# Patient Record
Sex: FEMALE | Race: WHITE
[De-identification: names, ages, dates, MRNs, and addresses within clinical notes are randomized per-mention and may not be internally consistent; named-entity substitution may affect disease eponyms.]

---

## 2017-07-28 ENCOUNTER — HOSPITAL ENCOUNTER (OUTPATIENT)
Dept: HOSPITAL 17 - NEPC | Age: 62
Setting detail: OBSERVATION
LOS: 1 days | Discharge: HOME | End: 2017-07-29
Attending: INTERNAL MEDICINE | Admitting: INTERNAL MEDICINE
Payer: MEDICARE

## 2017-07-28 VITALS — DIASTOLIC BLOOD PRESSURE: 72 MMHG | SYSTOLIC BLOOD PRESSURE: 119 MMHG | HEART RATE: 67 BPM | OXYGEN SATURATION: 97 %

## 2017-07-28 VITALS — HEART RATE: 72 BPM

## 2017-07-28 VITALS
HEART RATE: 56 BPM | SYSTOLIC BLOOD PRESSURE: 135 MMHG | OXYGEN SATURATION: 98 % | RESPIRATION RATE: 18 BRPM | TEMPERATURE: 98 F | DIASTOLIC BLOOD PRESSURE: 63 MMHG

## 2017-07-28 VITALS
TEMPERATURE: 98.4 F | RESPIRATION RATE: 20 BRPM | HEART RATE: 76 BPM | DIASTOLIC BLOOD PRESSURE: 73 MMHG | OXYGEN SATURATION: 99 % | SYSTOLIC BLOOD PRESSURE: 129 MMHG

## 2017-07-28 VITALS — WEIGHT: 284.4 LBS | HEIGHT: 72 IN | BODY MASS INDEX: 38.52 KG/M2

## 2017-07-28 VITALS — HEART RATE: 65 BPM

## 2017-07-28 VITALS
DIASTOLIC BLOOD PRESSURE: 57 MMHG | OXYGEN SATURATION: 98 % | RESPIRATION RATE: 17 BRPM | HEART RATE: 61 BPM | SYSTOLIC BLOOD PRESSURE: 113 MMHG

## 2017-07-28 VITALS
DIASTOLIC BLOOD PRESSURE: 71 MMHG | RESPIRATION RATE: 19 BRPM | HEART RATE: 62 BPM | OXYGEN SATURATION: 98 % | SYSTOLIC BLOOD PRESSURE: 120 MMHG | TEMPERATURE: 97.9 F

## 2017-07-28 VITALS
OXYGEN SATURATION: 94 % | HEART RATE: 65 BPM | SYSTOLIC BLOOD PRESSURE: 109 MMHG | RESPIRATION RATE: 20 BRPM | DIASTOLIC BLOOD PRESSURE: 57 MMHG | TEMPERATURE: 97.3 F

## 2017-07-28 VITALS — HEART RATE: 62 BPM

## 2017-07-28 DIAGNOSIS — R07.89: Primary | ICD-10-CM

## 2017-07-28 DIAGNOSIS — Z79.899: ICD-10-CM

## 2017-07-28 DIAGNOSIS — M79.602: ICD-10-CM

## 2017-07-28 DIAGNOSIS — R94.31: ICD-10-CM

## 2017-07-28 DIAGNOSIS — Z86.718: ICD-10-CM

## 2017-07-28 DIAGNOSIS — E66.01: ICD-10-CM

## 2017-07-28 DIAGNOSIS — R06.00: ICD-10-CM

## 2017-07-28 DIAGNOSIS — H40.9: ICD-10-CM

## 2017-07-28 DIAGNOSIS — J45.909: ICD-10-CM

## 2017-07-28 DIAGNOSIS — I10: ICD-10-CM

## 2017-07-28 LAB
ALP SERPL-CCNC: 58 U/L (ref 45–117)
ALT SERPL-CCNC: 13 U/L (ref 10–53)
ANION GAP SERPL CALC-SCNC: 6 MEQ/L (ref 5–15)
APTT BLD: 25.7 SEC (ref 24.3–30.1)
AST SERPL-CCNC: 8 U/L (ref 15–37)
BASOPHILS # BLD AUTO: 0 TH/MM3 (ref 0–0.2)
BASOPHILS NFR BLD: 0.5 % (ref 0–2)
BILIRUB SERPL-MCNC: 1 MG/DL (ref 0.2–1)
BUN SERPL-MCNC: 14 MG/DL (ref 7–18)
CHLORIDE SERPL-SCNC: 104 MEQ/L (ref 98–107)
CK SERPL-CCNC: 29 U/L (ref 26–192)
CK SERPL-CCNC: 32 U/L (ref 26–192)
CK SERPL-CCNC: 43 U/L (ref 26–192)
EOSINOPHIL # BLD: 0.1 TH/MM3 (ref 0–0.4)
EOSINOPHIL NFR BLD: 1.3 % (ref 0–4)
ERYTHROCYTE [DISTWIDTH] IN BLOOD BY AUTOMATED COUNT: 14 % (ref 11.6–17.2)
GFR SERPLBLD BASED ON 1.73 SQ M-ARVRAT: 81 ML/MIN (ref 89–?)
HCO3 BLD-SCNC: 29.1 MEQ/L (ref 21–32)
HCT VFR BLD CALC: 44.3 % (ref 35–46)
HEMO FLAGS: (no result)
INR PPP: 0.9 RATIO
LYMPHOCYTES # BLD AUTO: 1.8 TH/MM3 (ref 1–4.8)
LYMPHOCYTES NFR BLD AUTO: 26.6 % (ref 9–44)
MAGNESIUM SERPL-MCNC: 2.4 MG/DL (ref 1.5–2.5)
MCH RBC QN AUTO: 28.2 PG (ref 27–34)
MCHC RBC AUTO-ENTMCNC: 32.6 % (ref 32–36)
MCV RBC AUTO: 86.5 FL (ref 80–100)
MONOCYTES NFR BLD: 10 % (ref 0–8)
NEUTROPHILS # BLD AUTO: 4.1 TH/MM3 (ref 1.8–7.7)
NEUTROPHILS NFR BLD AUTO: 61.6 % (ref 16–70)
PLATELET # BLD: 145 TH/MM3 (ref 150–450)
POTASSIUM SERPL-SCNC: 4.7 MEQ/L (ref 3.5–5.1)
PROTHROMBIN TIME: 10.1 SEC (ref 9.8–11.6)
RBC # BLD AUTO: 5.12 MIL/MM3 (ref 4–5.3)
SODIUM SERPL-SCNC: 139 MEQ/L (ref 136–145)
WBC # BLD AUTO: 6.6 TH/MM3 (ref 4–11)

## 2017-07-28 PROCEDURE — 85730 THROMBOPLASTIN TIME PARTIAL: CPT

## 2017-07-28 PROCEDURE — 96374 THER/PROPH/DIAG INJ IV PUSH: CPT

## 2017-07-28 PROCEDURE — 93017 CV STRESS TEST TRACING ONLY: CPT

## 2017-07-28 PROCEDURE — 85610 PROTHROMBIN TIME: CPT

## 2017-07-28 PROCEDURE — 71275 CT ANGIOGRAPHY CHEST: CPT

## 2017-07-28 PROCEDURE — 83735 ASSAY OF MAGNESIUM: CPT

## 2017-07-28 PROCEDURE — 80053 COMPREHEN METABOLIC PANEL: CPT

## 2017-07-28 PROCEDURE — G0378 HOSPITAL OBSERVATION PER HR: HCPCS

## 2017-07-28 PROCEDURE — 93005 ELECTROCARDIOGRAM TRACING: CPT

## 2017-07-28 PROCEDURE — 85025 COMPLETE CBC W/AUTO DIFF WBC: CPT

## 2017-07-28 PROCEDURE — 71010: CPT

## 2017-07-28 PROCEDURE — 99285 EMERGENCY DEPT VISIT HI MDM: CPT

## 2017-07-28 PROCEDURE — 85379 FIBRIN DEGRADATION QUANT: CPT

## 2017-07-28 PROCEDURE — 82550 ASSAY OF CK (CPK): CPT

## 2017-07-28 PROCEDURE — 78452 HT MUSCLE IMAGE SPECT MULT: CPT

## 2017-07-28 PROCEDURE — 96361 HYDRATE IV INFUSION ADD-ON: CPT

## 2017-07-28 PROCEDURE — A9502 TC99M TETROFOSMIN: HCPCS

## 2017-07-28 PROCEDURE — 84484 ASSAY OF TROPONIN QUANT: CPT

## 2017-07-28 RX ADMIN — ACYCLOVIR SCH TAB: 800 TABLET ORAL at 17:59

## 2017-07-28 RX ADMIN — PHENYTOIN SODIUM SCH MLS/HR: 50 INJECTION INTRAMUSCULAR; INTRAVENOUS at 17:59

## 2017-07-28 RX ADMIN — Medication SCH ML: at 20:39

## 2017-07-28 RX ADMIN — PHENYTOIN SODIUM SCH MLS/HR: 50 INJECTION INTRAMUSCULAR; INTRAVENOUS at 10:07

## 2017-07-28 NOTE — RADRPT
EXAM DATE/TIME:  07/28/2017 13:04 

 

[facility] COMPARISON:     

No previous studies available for comparison.

 

 

INDICATIONS :     

Right upper quadrant pain and shortness of breath.

                      

 

IV CONTRAST:     

70 cc Omnipaque 350 (iohexol) IV 

 

 

RADIATION DOSE:     

25.81 CTDIvol (mGy) 

 

 

MEDICAL HISTORY :     

Hypertension. Deep venous thrombosis. 

 

SURGICAL HISTORY :      

Hysterectomy. Gastric bypass. Multiple hernia repairs

 

ENCOUNTER:      

Initial

 

ACUITY:      

2 days

 

PAIN SCALE:      

5/10

 

LOCATION:       

Right upper quadrant 

 

TECHNIQUE:     

Volumetric scanning of the chest was performed using a pulmonary embolism protocol MIP images were re
constructed.  Using automated exposure control and adjustment of the mA and/or kV according to patien
t size, radiation dose was kept as low as reasonably achievable to obtain optimal diagnostic quality 
images.   DICOM format image data is available electronically for review and comparison.  

 

Follow-up recommendations for incidentally detected pulmonary nodules are based at a minimum on nodul
e size and patient risk factors according to Fleischner Society Guidelines.

 

FINDINGS:     

The lungs are clear without infiltrate, nodule, or mass.  There is no pleural effusion.  No appreciab
le pathological adenopathy is seen within the mediastinum. There is no evidence for PE for technique.
 Coronary artery calcifications are seen typically seen with CAD and need to be evaluated clinically.


 

CONCLUSION:     

Unremarkable study except for coronary calcifications.

 

 

 

 

 BERTHA Barker MD on July 28, 2017 at 13:35           

Board Certified Radiologist.

 This report was verified electronically.

## 2017-07-28 NOTE — PD
HPI


Chief Complaint:  Respiratory Symptoms


Time Seen by Provider:  09:56


Travel History


International Travel<30 days:  No


Contact w/Intl Traveler<30days:  No


Traveled to known affect area:  No





History of Present Illness


HPI


61-year-old female with a history of hypertension, obesity, who presents with 

complaints of sudden onset of shortness of breath.  The patient denied chest 

pain, chest pressure.  She stated yesterday she started sprinting tingling down 

her left arm.  She reports no previous history of such.  She does report that 

she has a history of previous PVCs and had 2 ablation techniques when in 

[place].  She has been living here for a year and has not followed up with a 

cardiologist as she's had difficulty finding one.  She denies any chest pain, 

chest pressure.  She reports feeling "not right".  She does also have swelling 

in her legs.  She states that more than her left leg and in her right.  She 

reports that her ankle.  She denies a calf tenderness.  She does have an IVC 

filter in place.  She had that when she had her gastric bypass surgery many 

years ago.  He takes no anticoagulation.  The patient is allergic to anti-

inflammatories and therefore has not taken an aspirin.





PFSH


Past Medical History


Asthma:  Yes


Heart Rhythm Problems:  Yes (pvc)


Cardiovascular Problems:  Yes


Deep Vein Thrombosis:  Yes (IVC filter)


Hypertension:  Yes


Medical other:  Yes (gastric bypass )


Respiratory:  Yes


Influenza Vaccination:  Yes





Past Surgical History


Abdominal Surgery:  Yes (hernia repair)


Cardiac Surgery:  Yes (ablation)


Cholecystectomy:  Yes


Eye Surgery:  Yes (right eye removed d/t trauma)


Hysterectomy:  Yes


Tonsillectomy:  Yes (adenoids)


Other Surgery:  Yes (vena cath filter)





Social History


Alcohol Use:  No


Tobacco Use:  No


Substance Use:  No





Allergies-Medications


(Allergen,Severity, Reaction):  


Coded Allergies:  


     Adhesives (Verified  Allergy, Severe, Swelling, 7/28/17)


     Levaquin (Verified  Allergy, Severe, Edema, 7/28/17)


     Nonsteroidal Anti-Inflammatory Agts (Verified  Allergy, Severe, Rash, 7/28/ 17)


Reported Meds & Prescriptions





Reported Meds & Active Scripts


Active


Reported


Ergocalciferol 50,000 Unit Cap 50,000 Units PO Q7D


Multiple Vitamin 1 Tab 1 Tab PO DAILY


Ventolin Hfa 18 GM Inh (Albuterol Sulfate) 90 Mcg/Act Aer 2 Puff INH Q4H PRN


Lisinopril 5 Mg Tab 5 Mg PO DAILY








Review of Systems


Except as stated in HPI:  all other systems reviewed are Neg


General / Constitutional:  No: Fever, Chills


HENT:  No: Headaches, Lightheadedness, Neck Pain


Cardiovascular:  No: Chest Pain or Discomfort, Palpitations, Irregular Rhythm


Respiratory:  Positive: Shortness of Breath,  No: Cough


Gastrointestinal:  Positive: Nausea, Vomiting, Abdominal Pain (chronic 

abdominal pain secondary to her gastric bypass.  Nothing new.),  No: Diarrhea


Genitourinary:  No: Frequency, Dysuria


Musculoskeletal:  Positive: Edema (lateral lower extremity is, worse on the left

),  No: Weakness, Pain


Neurologic:  No: Weakness, Dizziness, Syncope, Headache





Physical Exam


Narrative


GENERAL: This is a very pleasant obese female in no obvious respiratory 

distress.


SKIN: Focused skin assessment warm/dry.


HEAD: Atraumatic. Normocephalic. 


EYES: A nucleated right eye secondary to previous trauma.  No scleral icterus. 

No injection or drainage. 


ENT:  Mucous membranes pink and moist.


NECK: Trachea midline.  Supple.


CARDIOVASCULAR: Regular rate and rhythm.  No murmur appreciated.


RESPIRATORY: No accessory muscle use. Clear to auscultation. Breath sounds 

equal bilaterally. 


GASTROINTESTINAL: Abdomen soft, non-tender, nondistended. Hepatic and splenic 

margins not palpable. 


MUSCULOSKELETAL: No obvious deformities. No clubbing.  No cyanosis.  Bilateral 

lower extremity pretibial edema.  Questionable slight increase on the left 

ankle versus right.  After tenderness, Homans sign.


NEUROLOGICAL: Awake and alert. No obvious cranial nerve deficits.  Motor 

grossly within normal limits. Normal speech.


PSYCHIATRIC: Appropriate mood and affect; insight and judgment normal.





Data


Data


Last Documented VS





Vital Signs








  Date Time  Temp Pulse Resp B/P Pulse Ox O2 Delivery O2 Flow Rate FiO2


 


7/28/17 09:56  67  119/72 97 Room Air  


 


7/28/17 09:26 98.4  20     








Orders





 Electrocardiogram (7/28/17 09:56)


Ckmb (Isoenzyme) Profile (7/28/17 09:56)


Complete Blood Count With Diff (7/28/17 09:56)


Comprehensive Metabolic Panel (7/28/17 09:56)


D-Dimer (7/28/17 09:56)


Magnesium (Mg) (7/28/17 09:56)


Prothrombin Time / Inr (Pt) (7/28/17 09:56)


Act Partial Throm Time (Ptt) (7/28/17 09:56)


Troponin I (7/28/17 09:56)


Chest, Single Ap (7/28/17 09:56)


Ecg Monitoring (7/28/17 09:56)


Bilateral Bp Monitoring (7/28/17 09:56)


Iv Access Insert/Monitor (7/28/17 09:56)


Oximetry (7/28/17 09:56)


Oxygen Administration (7/28/17 09:56)


Sodium Chloride 0.9% Flush (Ns Flush) (7/28/17 10:00)


Ondansetron Inj (Zofran Inj) (7/28/17 10:00)


Sodium Chlor 0.9% 1000 Ml Inj (Ns 1000 M (7/28/17 10:00)


Ct Pulmonary Angiogram (7/28/17 12:15)


Iohexol 350 Inj (Omnipaque 350 Inj) (7/28/17 13:23)


Admit Order (Ed Use Only) (7/28/17 13:59)





Labs








 Laboratory Tests








Test 7/28/17





 10:12


 


White Blood Count 6.6 TH/MM3


 


Red Blood Count 5.12 MIL/MM3


 


Hemoglobin 14.4 GM/DL


 


Hematocrit 44.3 %


 


Mean Corpuscular Volume 86.5 FL


 


Mean Corpuscular Hemoglobin 28.2 PG


 


Mean Corpuscular Hemoglobin 32.6 %





Concent 


 


Red Cell Distribution Width 14.0 %


 


Platelet Count 145 TH/MM3


 


Mean Platelet Volume 9.4 FL


 


Neutrophils (%) (Auto) 61.6 %


 


Lymphocytes (%) (Auto) 26.6 %


 


Monocytes (%) (Auto) 10.0 %


 


Eosinophils (%) (Auto) 1.3 %


 


Basophils (%) (Auto) 0.5 %


 


Neutrophils # (Auto) 4.1 TH/MM3


 


Lymphocytes # (Auto) 1.8 TH/MM3


 


Monocytes # (Auto) 0.7 TH/MM3


 


Eosinophils # (Auto) 0.1 TH/MM3


 


Basophils # (Auto) 0.0 TH/MM3


 


CBC Comment DIFF FINAL 


 


Differential Comment  


 


Prothrombin Time 10.1 SEC


 


Prothromb Time International 0.9 RATIO





Ratio 


 


Activated Partial 25.7 SEC





Thromboplast Time 


 


D-Dimer Quantitative (PE/DVT) 0.80 MG/L FEU


 


Sodium Level 139 MEQ/L


 


Potassium Level 4.7 MEQ/L


 


Chloride Level 104 MEQ/L


 


Carbon Dioxide Level 29.1 MEQ/L


 


Anion Gap 6 MEQ/L


 


Blood Urea Nitrogen 14 MG/DL


 


Creatinine 0.73 MG/DL


 


Estimat Glomerular Filtration 81 ML/MIN





Rate 


 


Random Glucose 99 MG/DL


 


Calcium Level 9.3 MG/DL


 


Magnesium Level 2.4 MG/DL


 


Total Bilirubin 1.0 MG/DL


 


Aspartate Amino Transf 8 U/L





(AST/SGOT) 


 


Alanine Aminotransferase 13 U/L





(ALT/SGPT) 


 


Alkaline Phosphatase 58 U/L


 


Total Creatine Kinase 43 U/L


 


Troponin I LESS THAN 0.02





 NG/ML


 


Total Protein 7.9 GM/DL


 


Albumin 4.0 GM/DL














[facility]


Medical Decision Making


Medical Screen Exam Complete:  Yes


Emergency Medical Condition:  Yes


Differential Diagnosis


ACS versus pulmonary embolus versus pneumonia versus CHF


Narrative Course


61-year-old female with history of hypertension, previous gastric bypass surgery

, who presents today with complaints of dyspnea and chest pain.  The patient 

had chest pain earlier followed by shortness of breath.  EKG shows no evidence 

of acute findings.  Cardiac enzymes were within normal limits.  Chest x-ray 

showed no acute findings.  Her d-dimer was elevated at 0.8 so CT angiogram was 

ordered.  This is negative for acute pulmonary him most.  The patient has had 

previous DVTs in the past but does have an IVC filter.  Given her history and 

comorbid conditions being hypertension and obesity, I would feel comfortable 

having her ruled out in the chest pain center.  I discussed this with both her 

and her partner and they're amenable.





Diagnosis





 Primary Impression:  


 Chest pain


 Additional Impressions:  


 Dyspnea


 History of hypertension


 Obesity





Admitting Information


Admitting Physician Requests:  Observation








Cody Jeronimo MD Jul 28, 2017 10:06

## 2017-07-28 NOTE — EKG
Date Performed: 07/28/2017       Time Performed: 09:50:31

 

PTAGE:      61 years

 

EKG:      Sinus rhythm 

 

 MARKED LEFT AXIS DEVIATION MODERATE INTRAVENTRICULAR CONDUCTION DELAY MINIMAL VOLTAGE CRITERIA FOR L
VH, CONSIDER NORMAL VARIANT ABNORMAL ECG 

 

NO PREVIOUS TRACING            

 

DOCTOR:   Marcos Harry  Interpretating Date/Time  07/28/2017 13:15:19

## 2017-07-28 NOTE — RADRPT
EXAM DATE/TIME:  07/28/2017 10:25 

 

[facility] COMPARISON:     

No previous studies available for comparison.

 

                     

INDICATIONS :     

Chest pain and shortness of breath.

                     

 

MEDICAL HISTORY :            

Asthma.   

 

SURGICAL HISTORY :        

Heart ablation x 2

 

ENCOUNTER:     

Initial                                        

 

ACUITY:     

1 day      

 

PAIN SCORE:     

4/10

 

LOCATION:     

Bilateral chest 

 

FINDINGS:     

A single view of the chest demonstrates the lungs to be symmetrically aerated without evidence of mas
s, infiltrate or effusion.  The cardiomediastinal contours are unremarkable.  Osseous structures are 
intact.

 

CONCLUSION:     No acute disease.  

 

 

 

 Fermin Rodriguez MD FACR on July 28, 2017 at 10:52           

Board Certified Radiologist.

 This report was verified electronically.

## 2017-07-28 NOTE — HHI.HP
[facility]


Primary Care Physician


Jabari Chiang MD


Chief Complaint


Chest pain


History of Present Illness


61-year-old female with history of asthma and morbidly obese presents to the 

emergency room for further evaluation of multiple issues including chest pain, 

shallow breathing, and left arm pain.  Last evening she completed a sleep 

study.  During the evening, she did not feel well and her left arm ached 

intermittently.  5 AM she returned home and states "I just didn't feel well."  

Described episodes of intermittent increasing pain in left arm. At one point 

developed left anterior chest heaviness, duration lasted seconds, without 

radiation.  Associated symptoms none, however endorses throughout morning she 

experience intermittent nausea.  Denies vomiting or diaphoresis.  Denies 

shortness of breath but has noticed she has been breathing shallow since last 

night. Does not hurt to breathe.  No particular position or movement makes pain 

better or worse.  No known precipitating or relieving factors.  No change in 

range of motion of left arm, no known trauma, denies any numbness or tingling 

of arm.  Endorses 2 years ago underwent 2 ablations. During that time she was 

unaware of the arrhythmias and was asymptomatic, therefore she decided to come 

to the emergency room for further evaluation of symptoms noted above.





Review of Systems


General: No fatigue,weakness, fever, chills, recent illness, recent travel, or 

change in appetite.  Has been her general state of health.  Mood from [facility] one year ago.  Has established with a PCP.  Scheduled for 

appointment with cardiology next month.


HEENT: No HA,  no dysphasia.  Left eye intermittent blurred vision one month. 

Her PCP has made her a referral for an ophthalmologist.  Right eye surgically 

removed. 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


CV: As stated above.  No current chest pain or pressure.  No palpitations.


RESP: No SOB, cough, wheeze, or recent URI.  History of asthma.  Reports rarely 

needs rescue inhaler.  Thin clear sputum production since last evening.


GI: No nausea or vomiting.  Chronic bowel changes including diarrhea and 

constipation. No pain, distention, melena, or blood in the stool.  Recent 

colonoscopy one year ago normal.  Multiple hernia repairs 13.  Lost 150 pounds 

status post gastric bypass surgery (2003), 120 pounds off since surgery.


: No dysuria, urgency, or frequency


EXT: No lower leg edema, no paraesthesias, has noticed left ankle intermittent 

swelling over the last month.  Intermittent leg crampsrecently started taking 

over-the-counter magnesium pills with some relief.


MS: No discomfort or change in ROM, ambulates independently


NEURO: No difficulty with balance, LOC, motor/sensory deficits


PSYCH: No anxiety, depression, or suicidal ideation


SKIN: No rashes, no concerning lesions





Past Family Social History


Allergies:  


Coded Allergies:  


     Adhesives (Verified  Allergy, Severe, Swelling, 7/28/17)


     Latex (Verified  Allergy, Severe, Hives, 7/28/17)


     Levaquin (Verified  Allergy, Severe, Edema, 7/28/17)


     Nonsteroidal Anti-Inflammatory Agts (Verified  Allergy, Severe, Rash, 7/28/ 17)


Past Medical History


Asthma, glaucoma, morbidly obese, cardiac ablations x2-unsuccessful


Past Surgical History


Total hysterectomy, gastric bypass(2003), 13 abdominal hernia repairs, 

bilateral knee replacements, tonsillectomy, enucleation of right eye


Reported Medications





Reported Meds & Active Scripts


Active


Reported


Ergocalciferol 50,000 Unit Cap 50,000 Units PO Q7D


Multiple Vitamin 1 Tab 1 Tab PO DAILY


Ventolin Hfa 18 GM Inh (Albuterol Sulfate) 90 Mcg/Act Aer 2 Puff INH Q4H PRN


Lisinopril 5 Mg Tab 5 Mg PO DAILY


Magnesium over the counter daily


Active Ordered Medications





 Current Medications








 Medications


  (Trade)  Dose


 Ordered  Sig/Fernando


 Route  Start Time


 Stop Time Status Last Admin


 


 Sodium Chloride 2


 ml  2 ml  UNSCH  PRN


 IVF  7/28/17 10:00


     


 


 


  (NS 1000 ml Inj)  1,000 ml @ 


 125 mls/hr  Q8H


 IV  7/28/17 10:00


    7/28/17 10:07


 


 


  (NS Flush)  2 ml  BID


 IV FLUSH  7/28/17 21:00


     


 


 


  (Tylenol)  500 mg  Q4H  PRN


 PO  7/28/17 14:30


     


 


 


  (Zofran Inj)  4 mg  Q6H  PRN


 IV  7/28/17 14:30


     


 


 


  (Nitrostat Sl)  0.4 mg  Q5M  PRN


 SL  7/28/17 14:30


     


 








Family History


Noncontributory for early onset cardiovascular disease.


Social History


No known coronary artery disease, diabetes, hypertension, or hyperlipidemia.


Lifelong nonsmoker.  Denies any alcohol or illegal drug use.


Endorses an active lifestyle.  Lives in a senior living community.  Swims weekly.





Past cardiac testing


No recent stress testing. Last stress test approximately 2 years ago.





Physical Exam


Vital Signs





 Vital Signs








  Date Time  Temp Pulse Resp B/P Pulse Ox O2 Delivery O2 Flow Rate FiO2


 


7/28/17 15:24 98.0 56 18 135/63 98   


 


7/28/17 13:28  61 17 113/57 98 Room Air  


 


7/28/17 09:56  67  119/72 97 Room Air  


 


7/28/17 09:26 98.4 76 20 129/73 99 Room Air  








Physical Exam


GENERAL: Alert WN, WD, NAD, pleasant, morbidly obese,  female who 

appears older than stated age


HEAD: NC, AT


EYES: Enucleation of right eye. Left sclera clear, conjunctiva without injection

, and pupil equal and round


ENT: Mucous membranes pink and moist, no nasal discharge or bleeding


NECK: Supple, no masses, trachea midline


CV: RRR, without murmur, rub, gallop, no JVD, S1-S2 no S3-S4.  No carotid 

bruits.


RESP: Clear lungs throughout bilateral, no crackles, wheeze, rhonchi, 

symmetrical chest rise, nonlabored, able to speak in full sentences


ABD: Soft, NT, ND, no masses, positive bowel tones, obese, multiple surgical 

abdominal scars


BACK: No CVAT, no scoliosis


EXT: Pulses +24, +1 pitting bilateral calves edema


MS: Normal tone 4 extremities, nontender, no obvious deformities, full range 

of motion


NEURO: CN II through CN XII grossly intact, motor strength 5/5, gait WNL


PSYCH: A+O 3, pleasant affect, appropriate speech, appropriate mood and affect

, insight and judgment


SKIN: Normal turgor, normal texture, no lesions, no rashes, brisk cap refill, 

nail beds brittle, even hair distribution, bilateral knee surgical scars.


Laboratory





Laboratory Tests








Test 7/28/17 7/28/17





 10:12 14:52


 


White Blood Count 6.6  


 


Red Blood Count 5.12  


 


Hemoglobin 14.4  


 


Hematocrit 44.3  


 


Mean Corpuscular Volume 86.5  


 


Mean Corpuscular Hemoglobin 28.2  


 


Mean Corpuscular Hemoglobin 32.6  





Concent  


 


Red Cell Distribution Width 14.0  


 


Platelet Count 145  


 


Mean Platelet Volume 9.4  


 


Neutrophils (%) (Auto) 61.6  


 


Lymphocytes (%) (Auto) 26.6  


 


Monocytes (%) (Auto) 10.0  


 


Eosinophils (%) (Auto) 1.3  


 


Basophils (%) (Auto) 0.5  


 


Neutrophils # (Auto) 4.1  


 


Lymphocytes # (Auto) 1.8  


 


Monocytes # (Auto) 0.7  


 


Eosinophils # (Auto) 0.1  


 


Basophils # (Auto) 0.0  


 


CBC Comment DIFF FINAL  


 


Differential Comment   


 


Prothrombin Time 10.1  


 


Prothromb Time International 0.9  





Ratio  


 


Activated Partial 25.7  





Thromboplast Time  


 


D-Dimer Quantitative (PE/DVT) 0.80  


 


Sodium Level 139  


 


Potassium Level 4.7  


 


Chloride Level 104  


 


Carbon Dioxide Level 29.1  


 


Anion Gap 6  


 


Blood Urea Nitrogen 14  


 


Creatinine 0.73  


 


Estimat Glomerular Filtration 81  





Rate  


 


Random Glucose 99  


 


Calcium Level 9.3  


 


Magnesium Level 2.4  


 


Total Bilirubin 1.0  


 


Aspartate Amino Transf 8  





(AST/SGOT)  


 


Alanine Aminotransferase 13  





(ALT/SGPT)  


 


Alkaline Phosphatase 58  


 


Total Creatine Kinase 43  32 


 


Troponin I LESS THAN 0.02  LESS THAN 0.02 


 


Total Protein 7.9  


 


Albumin 4.0  








Result Diagram:  


7/28/17 1012                                                                   

             7/28/17 1012





Imaging





Last Impressions








CT Angiography 7/28/17 1215 Signed





Impressions: 





 Service Date/Time:  Friday, July 28, 2017 13:04 - CONCLUSION:  Unremarkable 





 study except for coronary calcifications.      BERTHA Barker MD 


 


Chest X-Ray 7/28/17 0956 Signed





Impressions: 





 Service Date/Time:  Friday, July 28, 2017 10:25 - CONCLUSION: No acute 

disease.  





      Fermin Rodriguez MD  FACR








Course


EKG


Normal sinus rhythm, left axis deviation, no ST or T-segment changes





Assessment and Plan


Assessment and Plan


#1 Chest painadmitted to chest pain center.  Rule out with 3 sets of EKGs, 

cardiac enzymes, and will monitor overnight.  Will be seen and evaluated by Dr. Du Wheeler in a.m.  Discussed the likelihood of stress test in a.m.  

Patient is agreeable to plan of care.


#2 Dyspnea-continue to monitor, albuterol treatments Q2H PRN, instructed to 

notify nursing if required








Luciana Devine Jul 28, 2017 15:59

## 2017-07-29 VITALS
SYSTOLIC BLOOD PRESSURE: 126 MMHG | HEART RATE: 72 BPM | TEMPERATURE: 98 F | RESPIRATION RATE: 20 BRPM | DIASTOLIC BLOOD PRESSURE: 71 MMHG | OXYGEN SATURATION: 95 %

## 2017-07-29 VITALS
HEART RATE: 67 BPM | DIASTOLIC BLOOD PRESSURE: 54 MMHG | RESPIRATION RATE: 16 BRPM | TEMPERATURE: 97.6 F | SYSTOLIC BLOOD PRESSURE: 89 MMHG | OXYGEN SATURATION: 90 %

## 2017-07-29 VITALS
RESPIRATION RATE: 18 BRPM | HEART RATE: 64 BPM | DIASTOLIC BLOOD PRESSURE: 58 MMHG | SYSTOLIC BLOOD PRESSURE: 109 MMHG | TEMPERATURE: 98.4 F | OXYGEN SATURATION: 92 %

## 2017-07-29 VITALS
TEMPERATURE: 97.8 F | SYSTOLIC BLOOD PRESSURE: 110 MMHG | RESPIRATION RATE: 20 BRPM | HEART RATE: 57 BPM | OXYGEN SATURATION: 93 % | DIASTOLIC BLOOD PRESSURE: 57 MMHG

## 2017-07-29 VITALS — DIASTOLIC BLOOD PRESSURE: 67 MMHG | SYSTOLIC BLOOD PRESSURE: 110 MMHG

## 2017-07-29 VITALS
OXYGEN SATURATION: 96 % | RESPIRATION RATE: 20 BRPM | DIASTOLIC BLOOD PRESSURE: 68 MMHG | HEART RATE: 60 BPM | SYSTOLIC BLOOD PRESSURE: 114 MMHG

## 2017-07-29 VITALS — HEART RATE: 59 BPM

## 2017-07-29 VITALS — OXYGEN SATURATION: 97 %

## 2017-07-29 RX ADMIN — ACYCLOVIR SCH TAB: 800 TABLET ORAL at 08:17

## 2017-07-29 RX ADMIN — PHENYTOIN SODIUM SCH MLS/HR: 50 INJECTION INTRAMUSCULAR; INTRAVENOUS at 00:03

## 2017-07-29 RX ADMIN — Medication SCH ML: at 08:15

## 2017-07-29 RX ADMIN — PHENYTOIN SODIUM SCH MLS/HR: 50 INJECTION INTRAMUSCULAR; INTRAVENOUS at 08:15

## 2017-07-29 NOTE — EKG
Date Performed: 07/28/2017       Time Performed: 17:26:15

 

PTAGE:      61 years

 

EKG:      SINUS BRADYCARDIA MARKED LEFT AXIS DEVIATION MODERATE INTRAVENTRICULAR CONDUCTION DELAY MIN
IMAL VOLTAGE CRITERIA FOR LVH, CONSIDER NORMAL VARIANT ABNORMAL ECG

 

PREVIOUS TRACING       : 07/28/2017 15.01 Since previous tracing, no significant change noted

 

DOCTOR:   Du Wheeler  Interpretating Date/Time  07/29/2017 17:15:08

## 2017-07-29 NOTE — RADRPT
EXAM DATE/TIME:  07/29/2017 10:09 

 

[facility] COMPARISON:     

No previous studies available for comparison.

 

 

INDICATIONS :     

Left sided chest pain radiating to left arm. Angina. 

                           

 

DOSE:     

35 mCi Tc99m Myoview at stress.

                     11 mCi Tc99m Myoview at rest.

                     0.4 mg Lexiscan

                       

 

 

STRESS SYMPTOMS:      

Coughing.

                       

 

 

EJECTION FRACTION:       

59%

                       

 

MEDICAL HISTORY :     

Hypertension.   Asthma.

 

SURGICAL HISTORY :      

Inguinal hernia repair. Gastric bypass.   Bilateral knee surgery.

 

ENCOUNTER:     

Initial

 

ACUITY:     

1 day

 

PAIN SCALE:     

3/10

 

LOCATION:      

Left chest 

 

TECHNIQUE:     

The patient underwent pharmacologic stress with infusion of prescribed dose.  Continuous ECG tracing 
was monitored during stress.  Gated SPECT imaging was performed after stress and conventional SPECT i
maging was performed at rest.  The examination was performed on a SPECT/CT scanner, both attenuation 
and non-corrected datasets were reviewed.

 

FINDINGS:     

Moderate gut activity does obscure the inferior wall.

The best perfused myocardium is the anterior septal region.  There is minimal redistribution in the a
nterior wall at rest.  The ejection fraction is 59% with normal wall motion across this region.

 

CONCLUSION:     

Minimal redistribution anterior wall of the small segment of mid myocardium consistent with stress-in
duced ischemia.

 

RISK CATEGORY:     Low (<1% Annual Mortality Rate)

 

 

 

 

 Fermin Rodriguez MD FACR on July 29, 2017 at 12:02           

Board Certified Radiologist.

 This report was verified electronically.

## 2017-07-29 NOTE — EKG
Date Performed: 07/28/2017       Time Performed: 15:01:51

 

PTAGE:      61 years

 

EKG:      SINUS BRADYCARDIA MARKED LEFT AXIS DEVIATION MODERATE INTRAVENTRICULAR CONDUCTION DELAY MOD
ERATE VOLTAGE CRITERIA FOR LVH, CONSIDER NORMAL VARIANT ABNORMAL ECG

 

PREVIOUS TRACING       : 07/28/2017 09.50 Since previous tracing, no significant change noted

 

DOCTOR:   Du Wheeler  Interpretating Date/Time  07/29/2017 17:16:49

## 2017-07-29 NOTE — HHI.DCPOC
Discharge Care Plan


Diagnosis:  


(1) Atypical chest pain


(2) Musculoskeletal arm pain


(3) Asthma due to seasonal allergies


Goals to Promote Your Health


* To prevent worsening of your condition and complications


* To maintain your health at the optimal level


Directions to Meet Your Goals


*** Take your medications as prescribed


*** Follow your dietary instruction


*** Follow activity as directed








*** Keep your appointments as scheduled


*** Take your immunizations and boosters as scheduled


*** If your symptoms worsen call your PCP, if no PCP go to Urgent Care Center 

or Emergency Room***


*** Smoking is Dangerous to Your Health. Avoid second hand smoke***


***Call the 24-hour hour crisis hotline for domestic abuse at 1-503.653.5235***








Luciana Devine Jul 29, 2017 13:05

## 2018-01-06 ENCOUNTER — HOSPITAL ENCOUNTER (INPATIENT)
Dept: HOSPITAL 17 - PHED | Age: 63
LOS: 2 days | Discharge: HOME | DRG: 378 | End: 2018-01-08
Attending: HOSPITALIST | Admitting: HOSPITALIST
Payer: MEDICARE

## 2018-01-06 VITALS — DIASTOLIC BLOOD PRESSURE: 69 MMHG | SYSTOLIC BLOOD PRESSURE: 101 MMHG

## 2018-01-06 VITALS
RESPIRATION RATE: 20 BRPM | DIASTOLIC BLOOD PRESSURE: 69 MMHG | OXYGEN SATURATION: 95 % | HEART RATE: 77 BPM | TEMPERATURE: 98 F | SYSTOLIC BLOOD PRESSURE: 101 MMHG

## 2018-01-06 VITALS
SYSTOLIC BLOOD PRESSURE: 92 MMHG | DIASTOLIC BLOOD PRESSURE: 49 MMHG | RESPIRATION RATE: 16 BRPM | HEART RATE: 72 BPM | OXYGEN SATURATION: 96 %

## 2018-01-06 VITALS — OXYGEN SATURATION: 95 %

## 2018-01-06 VITALS
HEART RATE: 101 BPM | SYSTOLIC BLOOD PRESSURE: 119 MMHG | OXYGEN SATURATION: 98 % | TEMPERATURE: 98.2 F | DIASTOLIC BLOOD PRESSURE: 76 MMHG | RESPIRATION RATE: 20 BRPM

## 2018-01-06 VITALS
RESPIRATION RATE: 20 BRPM | OXYGEN SATURATION: 98 % | HEART RATE: 76 BPM | DIASTOLIC BLOOD PRESSURE: 63 MMHG | SYSTOLIC BLOOD PRESSURE: 144 MMHG | TEMPERATURE: 97.5 F

## 2018-01-06 VITALS
SYSTOLIC BLOOD PRESSURE: 83 MMHG | HEART RATE: 76 BPM | DIASTOLIC BLOOD PRESSURE: 54 MMHG | OXYGEN SATURATION: 95 % | RESPIRATION RATE: 18 BRPM

## 2018-01-06 VITALS — HEART RATE: 82 BPM

## 2018-01-06 VITALS — WEIGHT: 293 LBS | BODY MASS INDEX: 39.68 KG/M2 | HEIGHT: 72 IN

## 2018-01-06 DIAGNOSIS — E66.9: ICD-10-CM

## 2018-01-06 DIAGNOSIS — Z98.84: ICD-10-CM

## 2018-01-06 DIAGNOSIS — I48.91: ICD-10-CM

## 2018-01-06 DIAGNOSIS — K57.31: ICD-10-CM

## 2018-01-06 DIAGNOSIS — J45.20: ICD-10-CM

## 2018-01-06 DIAGNOSIS — Z91.040: ICD-10-CM

## 2018-01-06 DIAGNOSIS — Z88.1: ICD-10-CM

## 2018-01-06 DIAGNOSIS — I10: ICD-10-CM

## 2018-01-06 DIAGNOSIS — Z88.6: ICD-10-CM

## 2018-01-06 DIAGNOSIS — D62: ICD-10-CM

## 2018-01-06 DIAGNOSIS — K28.4: Primary | ICD-10-CM

## 2018-01-06 LAB
ALBUMIN SERPL-MCNC: 3.2 GM/DL (ref 3.4–5)
ALP SERPL-CCNC: 48 U/L (ref 45–117)
ALT SERPL-CCNC: 15 U/L (ref 10–53)
AST SERPL-CCNC: 7 U/L (ref 15–37)
BASOPHILS # BLD AUTO: 0 TH/MM3 (ref 0–0.2)
BASOPHILS NFR BLD: 0.1 % (ref 0–2)
BILIRUB SERPL-MCNC: 0.5 MG/DL (ref 0.2–1)
BUN SERPL-MCNC: 36 MG/DL (ref 7–18)
CALCIUM SERPL-MCNC: 8.3 MG/DL (ref 8.5–10.1)
CHLORIDE SERPL-SCNC: 108 MEQ/L (ref 98–107)
CREAT SERPL-MCNC: 0.82 MG/DL (ref 0.5–1)
EOSINOPHIL # BLD: 0.1 TH/MM3 (ref 0–0.4)
EOSINOPHIL NFR BLD: 1.2 % (ref 0–4)
ERYTHROCYTE [DISTWIDTH] IN BLOOD BY AUTOMATED COUNT: 13.1 % (ref 11.6–17.2)
GFR SERPLBLD BASED ON 1.73 SQ M-ARVRAT: 70 ML/MIN (ref 89–?)
GLUCOSE SERPL-MCNC: 108 MG/DL (ref 74–106)
HCO3 BLD-SCNC: 26.3 MEQ/L (ref 21–32)
HCT VFR BLD CALC: 35.6 % (ref 35–46)
HGB BLD-MCNC: 11.3 GM/DL (ref 11.6–15.3)
INR PPP: 1 RATIO
LYMPHOCYTES # BLD AUTO: 2.2 TH/MM3 (ref 1–4.8)
LYMPHOCYTES NFR BLD AUTO: 23.2 % (ref 9–44)
MCH RBC QN AUTO: 27.3 PG (ref 27–34)
MCHC RBC AUTO-ENTMCNC: 31.7 % (ref 32–36)
MCV RBC AUTO: 86.3 FL (ref 80–100)
MONOCYTE #: 0.8 TH/MM3 (ref 0–0.9)
MONOCYTES NFR BLD: 7.9 % (ref 0–8)
NEUTROPHILS # BLD AUTO: 6.6 TH/MM3 (ref 1.8–7.7)
NEUTROPHILS NFR BLD AUTO: 67.6 % (ref 16–70)
PLATELET # BLD: 136 TH/MM3 (ref 150–450)
PMV BLD AUTO: 9.9 FL (ref 7–11)
PROT SERPL-MCNC: 6.4 GM/DL (ref 6.4–8.2)
PROTHROMBIN TIME: 10 SEC (ref 9.8–11.6)
RBC # BLD AUTO: 4.13 MIL/MM3 (ref 4–5.3)
SODIUM SERPL-SCNC: 140 MEQ/L (ref 136–145)
WBC # BLD AUTO: 9.7 TH/MM3 (ref 4–11)

## 2018-01-06 PROCEDURE — 86901 BLOOD TYPING SEROLOGIC RH(D): CPT

## 2018-01-06 PROCEDURE — C9113 INJ PANTOPRAZOLE SODIUM, VIA: HCPCS

## 2018-01-06 PROCEDURE — 85025 COMPLETE CBC W/AUTO DIFF WBC: CPT

## 2018-01-06 PROCEDURE — 86850 RBC ANTIBODY SCREEN: CPT

## 2018-01-06 PROCEDURE — 86900 BLOOD TYPING SEROLOGIC ABO: CPT

## 2018-01-06 PROCEDURE — 85610 PROTHROMBIN TIME: CPT

## 2018-01-06 PROCEDURE — 88305 TISSUE EXAM BY PATHOLOGIST: CPT

## 2018-01-06 PROCEDURE — 80053 COMPREHEN METABOLIC PANEL: CPT

## 2018-01-06 PROCEDURE — 96366 THER/PROPH/DIAG IV INF ADDON: CPT

## 2018-01-06 PROCEDURE — 96365 THER/PROPH/DIAG IV INF INIT: CPT

## 2018-01-06 PROCEDURE — 85730 THROMBOPLASTIN TIME PARTIAL: CPT

## 2018-01-06 PROCEDURE — G0378 HOSPITAL OBSERVATION PER HR: HCPCS

## 2018-01-06 RX ADMIN — PHENYTOIN SODIUM SCH MLS/HR: 50 INJECTION INTRAMUSCULAR; INTRAVENOUS at 21:31

## 2018-01-06 RX ADMIN — PANTOPRAZOLE SODIUM SCH MLS/HR: 40 INJECTION, POWDER, FOR SOLUTION INTRAVENOUS at 21:12

## 2018-01-06 NOTE — PD
HPI


Chief Complaint:  GI Complaint


Time Seen by Provider:  19:48


Travel History


International Travel<30 days:  No


Contact w/Intl Traveler<30days:  No


Traveled to known affect area:  No





History of Present Illness


HPI


This is a 62-year-old female who had a gastric bypass in  2 presents to the 

emergency department with black stools that have been going on since yesterday, 

constant, loose, severe associated with some dizziness and lightheadedness.  She

's not used any Pepto-Bismol recently, she is not on any blood thinners and she 

doesn't take anti-inflammatories because of her history gastric bypass.  She 

says she had a colonoscopy one year ago which was normal but has not had a 

recent endoscopy.





PFS


Past Medical History


Asthma:  Yes


Atrial Fibrillation:  Yes


Heart Rhythm Problems:  Yes (pvc)


Cardiovascular Problems:  Yes (ABLATIONS)


Diabetes:  No


Diminished Hearing:  No


Deep Vein Thrombosis:  Yes (IVC filter)


Hypertension:  Yes


Respiratory:  Yes (ASTHMA)


Immunizations Current:  No


Influenza Vaccination:  Yes


Pregnant?:  Not Pregnant


:  1


Para:  1





Past Surgical History


Abdominal Surgery:  Yes (hernia repair)


Cardiac Surgery:  Yes (ablation)


Cholecystectomy:  Yes


Eye Surgery:  Yes (right eye removed d/t trauma)


Hysterectomy:  Yes


Tonsillectomy:  Yes (adenoids)


Other Surgery:  Yes (vena cath filter)





Social History


Alcohol Use:  No


Tobacco Use:  No


Substance Use:  No





Allergies-Medications


(Allergen,Severity, Reaction):  


Coded Allergies:  


     adhesive (Unverified  Allergy, Severe, Swelling, 18)


     diclofenac (Unverified  Allergy, Severe, Rash, 18)


     etodolac (Unverified  Allergy, Severe, Rash, 18)


     flurbiprofen (Unverified  Allergy, Severe, Rash, 18)


     ibuprofen (Unverified  Allergy, Severe, Rash, 18)


     indomethacin (Unverified  Allergy, Severe, Rash, 18)


     ketoprofen (Unverified  Allergy, Severe, Rash, 18)


     ketorolac (Unverified  Allergy, Severe, Rash, 18)


     latex (Unverified  Allergy, Severe, Hives, 18)


     levofloxacin (Unverified  Allergy, Severe, Edema, 18)


     naproxen (Unverified  Allergy, Severe, Rash, 18)


     oxaprozin (Unverified  Allergy, Severe, Rash, 18)


Reported Meds & Prescriptions





Reported Meds & Active Scripts


Active


Reported


Montelukast (Montelukast Sodium) 10 Mg Tab 10 Mg PO HS


Multiple Vitamin 1 Tab 1 Tab PO DAILY


Ventolin Hfa 18 GM Inh (Albuterol Sulfate) 90 Mcg/Act Aer 2 Puff INH Q4H PRN


Lisinopril 5 Mg Tab 5 Mg PO DAILY








Review of Systems


Except as stated in HPI:  all other systems reviewed are Neg





Physical Exam


Narrative


GENERAL:Well appearing, no acute distress


SKIN: Focused skin assessment warm and dry.


HEAD: Atraumatic. Normocephalic. 


EYES: Pupils equal and round.  No injection or drainage. 


ENT:  Moist mucous membranes


NECK: Trachea midline. 


CARDIOVASCULAR: Regular rate and rhythm.  No murmur appreciated.


RESPIRATORY: Clear to auscultation. Breath sounds equal bilaterally. 


GASTROINTESTINAL: Abdomen soft, non-tender, nondistended. 


MUSCULOSKELETAL: No obvious deformities. 


NEUROLOGICAL: Awake and alert. No obvious cranial nerve deficits.  Moving all 

extremities.


PSYCHIATRIC: Appropriate mood and affect; insight and judgment normal.





Data


Data


Last Documented VS





Vital Signs








  Date Time  Temp Pulse Resp B/P (MAP) Pulse Ox O2 Delivery O2 Flow Rate FiO2


 


18 20:29     95 Room Air  


 


18 19:55   16     


 


18 19:43 98.2 101  119/76 (90)    








Orders





 Orders


Complete Blood Count With Diff (18 20:04)


Comprehensive Metabolic Panel (18 20:04)


Prothrombin Time / Inr (Pt) (18 20:04)


Act Partial Throm Time (Ptt) (18 20:04)


Type And Screen (18 20:04)


Ecg Monitoring (18 20:04)


Iv Access Insert/Monitor (18 20:04)


Oximetry (18 20:04)


Sodium Chloride 0.9% Flush (Ns Flush) (18 20:15)


Sodium Chloride 0.9... W/Pantoprazole In (18 20:04)


Sodium Chloride 0.9... W/Pantoprazole In (18 20:04)


Admit Order (Ed Use Only) (18 21:03)





Labs





Laboratory Tests








Test


  18


20:18


 


White Blood Count 9.7 TH/MM3 


 


Red Blood Count 4.13 MIL/MM3 


 


Hemoglobin 11.3 GM/DL 


 


Hematocrit 35.6 % 


 


Mean Corpuscular Volume 86.3 FL 


 


Mean Corpuscular Hemoglobin 27.3 PG 


 


Mean Corpuscular Hemoglobin


Concent 31.7 % 


 


 


Red Cell Distribution Width 13.1 % 


 


Platelet Count 136 TH/MM3 


 


Mean Platelet Volume 9.9 FL 


 


Neutrophils (%) (Auto) 67.6 % 


 


Lymphocytes (%) (Auto) 23.2 % 


 


Monocytes (%) (Auto) 7.9 % 


 


Eosinophils (%) (Auto) 1.2 % 


 


Basophils (%) (Auto) 0.1 % 


 


Neutrophils # (Auto) 6.6 TH/MM3 


 


Lymphocytes # (Auto) 2.2 TH/MM3 


 


Monocytes # (Auto) 0.8 TH/MM3 


 


Eosinophils # (Auto) 0.1 TH/MM3 


 


Basophils # (Auto) 0.0 TH/MM3 


 


CBC Comment DIFF FINAL 


 


Differential Comment  


 


Prothrombin Time 10.0 SEC 


 


Prothromb Time International


Ratio 1.0 RATIO 


 


 


Activated Partial


Thromboplast Time 24.5 SEC 


 


 


Blood Urea Nitrogen 36 MG/DL 


 


Creatinine 0.82 MG/DL 


 


Random Glucose 108 MG/DL 


 


Total Protein 6.4 GM/DL 


 


Albumin 3.2 GM/DL 


 


Calcium Level 8.3 MG/DL 


 


Alkaline Phosphatase 48 U/L 


 


Aspartate Amino Transf


(AST/SGOT) 7 U/L 


 


 


Alanine Aminotransferase


(ALT/SGPT) 15 U/L 


 


 


Total Bilirubin 0.5 MG/DL 


 


Sodium Level 140 MEQ/L 


 


Potassium Level 4.2 MEQ/L 


 


Chloride Level 108 MEQ/L 


 


Carbon Dioxide Level 26.3 MEQ/L 


 


Anion Gap 6 MEQ/L 


 


Estimat Glomerular Filtration


Rate 70 ML/MIN 


 











[facility]


Medical Decision Making


Medical Screen Exam Complete:  Yes


Emergency Medical Condition:  Yes


Interpretation(s)


Heart rate is 101, normotensive


No leukocytosis


Electrolytes are reassuring


Differential Diagnosis


Peptic ulcer, gastritis, AVM, esophageal varices


Narrative Course


This is a 62-year-old female who presents to the emergency department with dark 

stools.  She has melena on exam.  She was placed on a monitor and an IV was 

established.  Initial vital signs are reassuring.  Hemoglobin is 11.3.  He went 

is elevated consistent with upper GI bleed.  Patient will be placed in 

observation for serial hemoglobins and GI consultation.





[facility] Point of Care


Internal Pos. & Neg. Controls:  Passed


Fecal Specimen Occult Blood:  Positive





Physician Communication


Physician Communication


Discussed with Dr. Forte





Admitting Information


Admitting Physician Requests:  Observation











Lian Evans MD 2018 20:15

## 2018-01-07 VITALS
HEART RATE: 74 BPM | TEMPERATURE: 98.7 F | RESPIRATION RATE: 20 BRPM | SYSTOLIC BLOOD PRESSURE: 100 MMHG | OXYGEN SATURATION: 99 % | DIASTOLIC BLOOD PRESSURE: 47 MMHG

## 2018-01-07 VITALS
HEART RATE: 76 BPM | DIASTOLIC BLOOD PRESSURE: 60 MMHG | RESPIRATION RATE: 22 BRPM | SYSTOLIC BLOOD PRESSURE: 110 MMHG | OXYGEN SATURATION: 96 % | TEMPERATURE: 97.9 F

## 2018-01-07 VITALS
SYSTOLIC BLOOD PRESSURE: 113 MMHG | RESPIRATION RATE: 20 BRPM | TEMPERATURE: 97 F | HEART RATE: 68 BPM | OXYGEN SATURATION: 96 % | DIASTOLIC BLOOD PRESSURE: 65 MMHG

## 2018-01-07 VITALS
RESPIRATION RATE: 20 BRPM | HEART RATE: 84 BPM | TEMPERATURE: 97.8 F | OXYGEN SATURATION: 96 % | DIASTOLIC BLOOD PRESSURE: 55 MMHG | SYSTOLIC BLOOD PRESSURE: 96 MMHG

## 2018-01-07 VITALS
HEART RATE: 88 BPM | SYSTOLIC BLOOD PRESSURE: 104 MMHG | RESPIRATION RATE: 20 BRPM | DIASTOLIC BLOOD PRESSURE: 56 MMHG | OXYGEN SATURATION: 97 % | TEMPERATURE: 98.2 F

## 2018-01-07 LAB
ALBUMIN SERPL-MCNC: 2.8 GM/DL (ref 3.4–5)
ALP SERPL-CCNC: 36 U/L (ref 45–117)
ALT SERPL-CCNC: 13 U/L (ref 10–53)
AST SERPL-CCNC: 5 U/L (ref 15–37)
BASOPHILS # BLD AUTO: 0 TH/MM3 (ref 0–0.2)
BASOPHILS NFR BLD: 0.4 % (ref 0–2)
BILIRUB SERPL-MCNC: 0.6 MG/DL (ref 0.2–1)
BUN SERPL-MCNC: 46 MG/DL (ref 7–18)
CALCIUM SERPL-MCNC: 7.7 MG/DL (ref 8.5–10.1)
CHLORIDE SERPL-SCNC: 111 MEQ/L (ref 98–107)
CREAT SERPL-MCNC: 0.54 MG/DL (ref 0.5–1)
EOSINOPHIL # BLD: 0.1 TH/MM3 (ref 0–0.4)
EOSINOPHIL NFR BLD: 0.9 % (ref 0–4)
ERYTHROCYTE [DISTWIDTH] IN BLOOD BY AUTOMATED COUNT: 13.2 % (ref 11.6–17.2)
GFR SERPLBLD BASED ON 1.73 SQ M-ARVRAT: 114 ML/MIN (ref 89–?)
GLUCOSE SERPL-MCNC: 106 MG/DL (ref 74–106)
HCO3 BLD-SCNC: 24.8 MEQ/L (ref 21–32)
HCT VFR BLD CALC: 28.4 % (ref 35–46)
HGB BLD-MCNC: 9.2 GM/DL (ref 11.6–15.3)
LYMPHOCYTES # BLD AUTO: 1.6 TH/MM3 (ref 1–4.8)
LYMPHOCYTES NFR BLD AUTO: 25.7 % (ref 9–44)
MCH RBC QN AUTO: 28.4 PG (ref 27–34)
MCHC RBC AUTO-ENTMCNC: 32.3 % (ref 32–36)
MCV RBC AUTO: 88.1 FL (ref 80–100)
MONOCYTE #: 0.5 TH/MM3 (ref 0–0.9)
MONOCYTES NFR BLD: 8.2 % (ref 0–8)
NEUTROPHILS # BLD AUTO: 3.8 TH/MM3 (ref 1.8–7.7)
NEUTROPHILS NFR BLD AUTO: 64.8 % (ref 16–70)
PLATELET # BLD: 108 TH/MM3 (ref 150–450)
PMV BLD AUTO: 8.7 FL (ref 7–11)
PROT SERPL-MCNC: 5.5 GM/DL (ref 6.4–8.2)
RBC # BLD AUTO: 3.23 MIL/MM3 (ref 4–5.3)
SODIUM SERPL-SCNC: 142 MEQ/L (ref 136–145)
WBC # BLD AUTO: 6 TH/MM3 (ref 4–11)

## 2018-01-07 RX ADMIN — PHENYTOIN SODIUM SCH MLS/HR: 50 INJECTION INTRAMUSCULAR; INTRAVENOUS at 16:20

## 2018-01-07 RX ADMIN — Medication SCH ML: at 20:01

## 2018-01-07 RX ADMIN — PHENYTOIN SODIUM SCH MLS/HR: 50 INJECTION INTRAMUSCULAR; INTRAVENOUS at 07:35

## 2018-01-07 RX ADMIN — PANTOPRAZOLE SODIUM SCH MLS/HR: 40 INJECTION, POWDER, FOR SOLUTION INTRAVENOUS at 16:20

## 2018-01-07 RX ADMIN — STANDARDIZED SENNA CONCENTRATE AND DOCUSATE SODIUM SCH TAB: 8.6; 5 TABLET, FILM COATED ORAL at 20:02

## 2018-01-07 RX ADMIN — STANDARDIZED SENNA CONCENTRATE AND DOCUSATE SODIUM SCH TAB: 8.6; 5 TABLET, FILM COATED ORAL at 08:47

## 2018-01-07 RX ADMIN — Medication SCH ML: at 07:35

## 2018-01-07 RX ADMIN — STANDARDIZED SENNA CONCENTRATE AND DOCUSATE SODIUM SCH TAB: 8.6; 5 TABLET, FILM COATED ORAL at 20:01

## 2018-01-07 RX ADMIN — LISINOPRIL SCH MG: 5 TABLET ORAL at 08:47

## 2018-01-07 RX ADMIN — ACYCLOVIR SCH TAB: 800 TABLET ORAL at 08:47

## 2018-01-07 RX ADMIN — PANTOPRAZOLE SODIUM SCH MLS/HR: 40 INJECTION, POWDER, FOR SOLUTION INTRAVENOUS at 07:35

## 2018-01-07 NOTE — HHI.HP
__________________________________________________





[facility] [place]


Service


[facility]


Primary Care Physician


Non-Staff


Admission Diagnosis





upper gi bleed


Diagnoses:  


Chief Complaint:  


black stool


Travel History


International Travel<30 Days:  No


Contact w/Intl Traveler <30 Da:  No


Traveled to Known Affected Are:  No


History of Present Illness


62-year-old white female being admitted for melena.





Patient was in her usual state of health until earlier this week when she began 

experiencing some generalized fatigue and some paleness.  Yesterday she began 

having brownish black stools which then progressed to complete black stools and 

thus decided to proceed to the emergency department.  She does report having 

some associated left upper quadrant pain but she is unable to assess as to 

whether this is due to her chronic pains that she has from her multiple hernias 

or this is new and distinct.  Patient denies taking any nonsteroidal anti-

inflammatory drugs or any blood thinners. Patient states she had a colonoscopy 

3 months ago in [place] and had a non-malignant polyp removed.





Patient has a history of bypass surgery.  She says she has a history of A. fib 

but did undergo ablations which were ultimately not successful. She states she 

actually had an IVC filter prior to her bypass surgery because per her 

understanding her physicians thought she was going to throw a blood clot.





Review of Systems


Except as stated in HPI:  all other systems reviewed are Neg





Past Family Social History


Past Medical History


Atrial fibrillation


Obesity


Asthma


HTN


Past Surgical History


multiple hernia surgeries


hysterectomy


right eye removal 2/2 trauma


Allergies:  


Coded Allergies:  


     adhesive (Unverified  Allergy, Severe, Swelling, 18)


     diclofenac (Unverified  Allergy, Severe, Rash, 18)


     etodolac (Unverified  Allergy, Severe, Rash, 18)


     flurbiprofen (Unverified  Allergy, Severe, Rash, 18)


     ibuprofen (Unverified  Allergy, Severe, Rash, 18)


     indomethacin (Unverified  Allergy, Severe, Rash, 18)


     ketoprofen (Unverified  Allergy, Severe, Rash, 18)


     ketorolac (Unverified  Allergy, Severe, Rash, 18)


     latex (Unverified  Allergy, Severe, Hives, 18)


     levofloxacin (Unverified  Allergy, Severe, Edema, 18)


     naproxen (Unverified  Allergy, Severe, Rash, 18)


     oxaprozin (Unverified  Allergy, Severe, Rash, 18)


Family History


No family history of colorectal cancer


Social History


Claims she has been disabled since her 30s due to her hernias and surgeries





Physical Exam


Vital Signs





Vital Signs








  Date Time  Temp Pulse Resp B/P (MAP) Pulse Ox O2 Delivery O2 Flow Rate FiO2


 


18 08:00 97.0 68 20 113/65 (81) 96   


 


18 04:00 97.8 84 20 96/55 (69) 96   


 


18 23:20  82      


 


18 23:03  77 18 101/69 (80) 95   


 


18 23:00 97.5 76 20 144/63 (90) 98   


 


18 22:43 98.0 77 20 101/69 (80) 95 Room Air  


 


18 21:57  72 16 92/49 (63) 96 Room Air  


 


18 21:19  76 18 83/54 (64) 95 Room Air  


 


18 20:29     95 Room Air  


 


18 19:55   16     


 


18 19:43 98.2 101 20 119/76 (90) 98   








Physical Exam


VS: afebrile


GENERAL: Elderly obese white female, awake, no acute distress


SKIN: Warm and dry.


EYES: No scleral icterus. No injection or drainage. s/p right eye removal. 


ENT: No nasal bleeding or discharge.  Mucous membranes pink and moist.


CARDIOVASCULAR: Regular rate and rhythm.  no murmurs


RESPIRATORY: No accessory muscle use. Clear to auscultation. Breath sounds 

equal bilaterally. 


GASTROINTESTINAL: Abdomen soft, non-tender, nondistended. Obese abdomen; unable 

to palpate any hernias 


Extremities: No clubbing, cyanosis, or edema. No obvious deformities. 


MUSCULOSKELETAL: grossly intact ROM with 5/5 strength in upper and lower 

extremities proximally; adequate muscle bulk and tone for age and habitus


NEUROLOGICAL: Awake and alert. No obvious cranial nerve deficits.  No facial 

droop nor slurred speech noted.


PSYCHIATRIC: Appropriate mood and affect; insight and judgment normal.


Laboratory





Laboratory Tests








Test


  18


20:18 18


06:20


 


White Blood Count 9.7  6.0 


 


Red Blood Count 4.13  3.23 


 


Hemoglobin 11.3  9.2 


 


Hematocrit 35.6  28.4 


 


Mean Corpuscular Volume 86.3  88.1 


 


Mean Corpuscular Hemoglobin 27.3  28.4 


 


Mean Corpuscular Hemoglobin


Concent 31.7 


  32.3 


 


 


Red Cell Distribution Width 13.1  13.2 


 


Platelet Count 136  108 


 


Mean Platelet Volume 9.9  8.7 


 


Neutrophils (%) (Auto) 67.6  64.8 


 


Lymphocytes (%) (Auto) 23.2  25.7 


 


Monocytes (%) (Auto) 7.9  8.2 


 


Eosinophils (%) (Auto) 1.2  0.9 


 


Basophils (%) (Auto) 0.1  0.4 


 


Neutrophils # (Auto) 6.6  3.8 


 


Lymphocytes # (Auto) 2.2  1.6 


 


Monocytes # (Auto) 0.8  0.5 


 


Eosinophils # (Auto) 0.1  0.1 


 


Basophils # (Auto) 0.0  0.0 


 


CBC Comment DIFF FINAL  DIFF FINAL 


 


Differential Comment    


 


Prothrombin Time 10.0  


 


Prothromb Time International


Ratio 1.0 


  


 


 


Activated Partial


Thromboplast Time 24.5 


  


 


 


Blood Urea Nitrogen 36  46 


 


Creatinine 0.82  0.54 


 


Random Glucose 108  106 


 


Total Protein 6.4  5.5 


 


Albumin 3.2  2.8 


 


Calcium Level 8.3  7.7 


 


Alkaline Phosphatase 48  36 


 


Aspartate Amino Transf


(AST/SGOT) 7 


  5 


 


 


Alanine Aminotransferase


(ALT/SGPT) 15 


  13 


 


 


Total Bilirubin 0.5  0.6 


 


Sodium Level 140  142 


 


Potassium Level 4.2  4.5 


 


Chloride Level 108  111 


 


Carbon Dioxide Level 26.3  24.8 


 


Anion Gap 6  6 


 


Estimat Glomerular Filtration


Rate 70 


  114 


 








Result Diagram:  


18








Caprini VTE Risk Assessment


Caprini VTE Risk Assessment:  Mod/High Risk (score >= 2)


Caprini Risk Assessment Model











 Point Value = 1          Point Value = 2  Point Value = 3  Point Value = 5


 


Age 41-60


Minor surgery


BMI > 25 kg/m2


Swollen legs


Varicose veins


Pregnancy or postpartum


History of unexplained or recurrent


   spontaneous 


Oral contraceptives or hormone


   replacement


Sepsis (< 1 month)


Serious lung disease, including


   pneumonia (< 1 month)


Abnormal pulmonary function


Acute myocardial infarction


Congestive heart failure (< 1 month)


History of inflammatory bowel disease


Medical patient at bed rest Age 61-74


Arthroscopic surgery


Major open surgery (> 45 min)


Laparoscopic surgery (> 45 min)


Malignancy


Confined to bed (> 72 hours)


Immobilizing plaster cast


Central venous access Age >= 75


History of VTE


Family history of VTE


Factor V Leiden


Prothrombin 67594P


Lupus anticoagulant


Anticardiolipin antibodies


Elevated serum homocysteine


Heparin-induced thrombocytopenia


Other congenital or acquired


   thrombophilia Stroke (< 1 month)


Elective arthroplasty


Hip, pelvis, or leg fracture


Acute spinal cord injury (< 1 month)








Prophylaxis Regimen











   Total Risk


Factor Score Risk Level Prophylaxis Regimen


 


0-1      Low Early ambulation


 


2 Moderate Order ONE of the following:


*Sequential Compression Device (SCD)


*Heparin 5000 units SQ BID


 


3-4 Higher Order ONE of the following medications:


*Heparin 5000 units SQ TID


*Enoxaparin/Lovenox 40 mg SQ daily (WT < 150 kg, CrCl > 30 mL/min)


*Enoxaparin/Lovenox 30 mg SQ daily (WT < 150 kg, CrCl > 10-29 mL/min)


*Enoxaparin/Lovenox 30 mg SQ BID (WT < 150 kg, CrCl > 30 mL/min)


AND/OR


*Sequential Compression Device (SCD)


 


5 or more Highest Order ONE of the following medications:


*Heparin 5000 units SQ TID (Preferred with Epidurals)


*Enoxaparin/Lovenox 40 mg SQ daily (WT < 150 kg, CrCl > 30 mL/min)


*Enoxaparin/Lovenox 30 mg SQ daily (WT < 150 kg, CrCl > 10-29 mL/min)


*Enoxaparin/Lovenox 30 mg SQ BID (WT < 150 kg, CrCl > 30 mL/min)


AND


*Sequential Compression Device (SCD)











Assessment and Plan


Assessment and Plan





Suspected upper GI bleed


- Continue Protonix; consulting GI; we'll hold off on diet until GI clears that 

no procedures will happen today





Anemia likely from acute on chronic blood loss


- Monitor h/h





Asthma mild intermittent


- alb as needed


- montelukast





SCDs.





Physician Certification


2 Midnight Certification Type:  Admission for Inpatient Services


Order for Inpatient Services


The services are ordered in accordance with Medicare regulations or non-

Medicare payer requirements, as applicable.  In the case of services not 

specified as inpatient-only, they are appropriately provided as inpatient 

services in accordance with the 2-midnight benchmark.


Estimated LOS (days):  2


2 days is the estimated time the patient will need to remain in the hospital, 

assuming treatment plan goals are met and no additional complications.


Post-Hospital Plan:  Home











Kobe Bustillo MD 2018 10:19

## 2018-01-07 NOTE — MB
cc:

EBER FERREIRA M.D., HAMMAD M. MD

****

 

 

DATE OF CONSULTATION:  01/07/2018

 

REASON FOR CONSULTATION:

Melena and anemia.

 

PATIENT OF:

Dr. Kobe Bustillo.

 

 

HISTORY OF PRESENT ILLNESS:

Ms. Diego is a 62-year-old lady basically admitted with generalized fatigue and

melanotic stools.  She has also had some intermittent discomfort in the upper

abdomen but she says this is chronic for her.  She is not taking any

anticoagulation and she is not on any NSAIDS.  She says she had a colonoscopy

done about a year and a half ago in [place], which was reported as normal

other than a polyp removal.

 

PAST MEDICAL HISTORY:

Her past medical history is significant for:

1. Atrial fibrillation with IVC filter placement.

2. Obesity.

3. Asthma.

4. Hypertension.

 

PAST SURGICAL HISTORY:

1. Multiple hernia surgeries.

2. Hysterectomy.

3. Eye surgery.

4. [place] ago [place]

 

ALLERGIES:

1. IBUPROFEN.

2. LEVAQUIN.

 

FAMILY HISTORY:

Noncontributory.

 

SOCIAL HISTORY:

No tobacco, no alcohol reported.

 

 

PHYSICAL EXAMINATION:

GENERAL:  The physical exam reveals a well-nourished lady in no apparent

distress.

VITAL SIGNS: Stable.

HEAD AND NECK: Anicteric sclerae.

CHEST: Bilateral air entry with rales.

ABDOMEN: Abdomen is soft, obese, nontender. No hepatosplenomegaly. Bowel sounds

are present.

CNS: Nonfocal.

RECTAL: Deferred at this time.

 

LABS:

Hemoglobin of 9.2.

 

INR is 1.

 

Creatinine is 0.54.

 

Liver function tests are normal.

 

IMPRESSION:

Melena, anemia.

 

RECOMMENDATIONS:

1. EGD and colonoscopy planned tomorrow with magnesium citrate prep.

2. Continue to monitor labs.

3. Dr. Venegas will follow from tomorrow.

 

Thank you for this referral.

 

 

                              _________________________________

                              Eber Ferreira MD

 

 

 

/JC

D:  1/7/2018/6:00 PM

T:  1/7/2018/6:41 PM

Visit #:  [id number]

Job #:  [id number]

## 2018-01-08 VITALS
OXYGEN SATURATION: 98 % | HEART RATE: 77 BPM | SYSTOLIC BLOOD PRESSURE: 99 MMHG | TEMPERATURE: 97.9 F | DIASTOLIC BLOOD PRESSURE: 57 MMHG | RESPIRATION RATE: 20 BRPM

## 2018-01-08 VITALS
DIASTOLIC BLOOD PRESSURE: 54 MMHG | SYSTOLIC BLOOD PRESSURE: 98 MMHG | OXYGEN SATURATION: 98 % | HEART RATE: 65 BPM | RESPIRATION RATE: 18 BRPM | TEMPERATURE: 96.7 F

## 2018-01-08 VITALS
SYSTOLIC BLOOD PRESSURE: 97 MMHG | TEMPERATURE: 97.6 F | DIASTOLIC BLOOD PRESSURE: 64 MMHG | HEART RATE: 66 BPM | OXYGEN SATURATION: 98 % | RESPIRATION RATE: 20 BRPM

## 2018-01-08 LAB
BASOPHILS # BLD AUTO: 0 TH/MM3 (ref 0–0.2)
BASOPHILS # BLD AUTO: 0 TH/MM3 (ref 0–0.2)
BASOPHILS NFR BLD: 0.3 % (ref 0–2)
BASOPHILS NFR BLD: 0.6 % (ref 0–2)
EOSINOPHIL # BLD: 0.1 TH/MM3 (ref 0–0.4)
EOSINOPHIL # BLD: 0.2 TH/MM3 (ref 0–0.4)
EOSINOPHIL NFR BLD: 1.9 % (ref 0–4)
EOSINOPHIL NFR BLD: 2.4 % (ref 0–4)
ERYTHROCYTE [DISTWIDTH] IN BLOOD BY AUTOMATED COUNT: 12.6 % (ref 11.6–17.2)
ERYTHROCYTE [DISTWIDTH] IN BLOOD BY AUTOMATED COUNT: 13 % (ref 11.6–17.2)
HCT VFR BLD CALC: 26.7 % (ref 35–46)
HCT VFR BLD CALC: 27.1 % (ref 35–46)
HGB BLD-MCNC: 8.5 GM/DL (ref 11.6–15.3)
HGB BLD-MCNC: 8.6 GM/DL (ref 11.6–15.3)
LYMPHOCYTES # BLD AUTO: 1.4 TH/MM3 (ref 1–4.8)
LYMPHOCYTES # BLD AUTO: 2 TH/MM3 (ref 1–4.8)
LYMPHOCYTES NFR BLD AUTO: 26.8 % (ref 9–44)
LYMPHOCYTES NFR BLD AUTO: 30 % (ref 9–44)
MCH RBC QN AUTO: 27.7 PG (ref 27–34)
MCH RBC QN AUTO: 28 PG (ref 27–34)
MCHC RBC AUTO-ENTMCNC: 31.9 % (ref 32–36)
MCHC RBC AUTO-ENTMCNC: 32 % (ref 32–36)
MCV RBC AUTO: 86.9 FL (ref 80–100)
MCV RBC AUTO: 87.4 FL (ref 80–100)
MONOCYTE #: 0.2 TH/MM3 (ref 0–0.9)
MONOCYTE #: 0.5 TH/MM3 (ref 0–0.9)
MONOCYTES NFR BLD: 4.8 % (ref 0–8)
MONOCYTES NFR BLD: 7.5 % (ref 0–8)
NEUTROPHILS # BLD AUTO: 3.5 TH/MM3 (ref 1.8–7.7)
NEUTROPHILS # BLD AUTO: 3.9 TH/MM3 (ref 1.8–7.7)
NEUTROPHILS NFR BLD AUTO: 59.8 % (ref 16–70)
NEUTROPHILS NFR BLD AUTO: 65.9 % (ref 16–70)
PLATELET # BLD: 112 TH/MM3 (ref 150–450)
PLATELET # BLD: 114 TH/MM3 (ref 150–450)
PMV BLD AUTO: 9.8 FL (ref 7–11)
PMV BLD AUTO: 9.8 FL (ref 7–11)
RBC # BLD AUTO: 3.05 MIL/MM3 (ref 4–5.3)
RBC # BLD AUTO: 3.12 MIL/MM3 (ref 4–5.3)
WBC # BLD AUTO: 5.2 TH/MM3 (ref 4–11)
WBC # BLD AUTO: 6.6 TH/MM3 (ref 4–11)

## 2018-01-08 PROCEDURE — 0DB68ZX EXCISION OF STOMACH, VIA NATURAL OR ARTIFICIAL OPENING ENDOSCOPIC, DIAGNOSTIC: ICD-10-PCS | Performed by: INTERNAL MEDICINE

## 2018-01-08 PROCEDURE — 0DJD8ZZ INSPECTION OF LOWER INTESTINAL TRACT, VIA NATURAL OR ARTIFICIAL OPENING ENDOSCOPIC: ICD-10-PCS | Performed by: INTERNAL MEDICINE

## 2018-01-08 RX ADMIN — PHENYTOIN SODIUM SCH MLS/HR: 50 INJECTION INTRAMUSCULAR; INTRAVENOUS at 01:32

## 2018-01-08 RX ADMIN — STANDARDIZED SENNA CONCENTRATE AND DOCUSATE SODIUM SCH TAB: 8.6; 5 TABLET, FILM COATED ORAL at 10:22

## 2018-01-08 RX ADMIN — Medication SCH ML: at 10:22

## 2018-01-08 RX ADMIN — PANTOPRAZOLE SODIUM SCH MLS/HR: 40 INJECTION, POWDER, FOR SOLUTION INTRAVENOUS at 12:53

## 2018-01-08 RX ADMIN — PHENYTOIN SODIUM SCH MLS/HR: 50 INJECTION INTRAMUSCULAR; INTRAVENOUS at 13:05

## 2018-01-08 RX ADMIN — LISINOPRIL SCH MG: 5 TABLET ORAL at 09:00

## 2018-01-08 RX ADMIN — PANTOPRAZOLE SODIUM SCH MLS/HR: 40 INJECTION, POWDER, FOR SOLUTION INTRAVENOUS at 01:27

## 2018-01-08 RX ADMIN — ACYCLOVIR SCH TAB: 800 TABLET ORAL at 10:21

## 2018-01-08 NOTE — HHI.GIFU
Subjective


Remarks


Patient laying in bed comfortably, took most of the prep, no sign of active 

bleeding at this time but still have some black stool





Objective


Vitals I&O





Vital Signs








  Date Time  Temp Pulse Resp B/P (MAP) Pulse Ox O2 Delivery O2 Flow Rate FiO2


 


1/8/18 04:00 97.6 66 20 97/64 (75) 98   


 


1/8/18 00:00 97.9 77 20 99/57 (71) 98   


 


1/7/18 20:00 98.7 74 20 100/47 (64) 99   


 


1/7/18 20:00  84      


 


1/7/18 16:00 97.9 76 22 110/60 (77) 96   


 


1/7/18 12:00 98.2 88 20 104/56 (72) 97   


 


1/7/18 08:00 97.0 68 20 113/65 (81) 96   














I/O      


 


 1/7/18 1/7/18 1/7/18 1/8/18 1/8/18 1/8/18





 07:00 15:00 23:00 07:00 15:00 23:00


 


Intake Total 1151.5 ml 0 ml  1386 ml 600 ml 


 


Balance 1151.5 ml 0 ml  1386 ml 600 ml 


 


      


 


Intake Oral 0 ml 0 ml   600 ml 


 


IV Total 1151.5 ml   1386 ml  


 


# Voids 3 8   3 


 


# Bowel Movements 0 2   7 








Laboratory





Laboratory Tests








Test


  1/8/18


04:35


 


White Blood Count 6.6 


 


Red Blood Count 3.05 


 


Hemoglobin 8.5 


 


Hematocrit 26.7 


 


Mean Corpuscular Volume 87.4 


 


Mean Corpuscular Hemoglobin 28.0 


 


Mean Corpuscular Hemoglobin


Concent 32.0 


 


 


Red Cell Distribution Width 12.6 


 


Platelet Count 112 


 


Mean Platelet Volume 9.8 


 


Neutrophils (%) (Auto) 59.8 


 


Lymphocytes (%) (Auto) 30.0 


 


Monocytes (%) (Auto) 7.5 


 


Eosinophils (%) (Auto) 2.4 


 


Basophils (%) (Auto) 0.3 


 


Neutrophils # (Auto) 3.9 


 


Lymphocytes # (Auto) 2.0 


 


Monocytes # (Auto) 0.5 


 


Eosinophils # (Auto) 0.2 


 


Basophils # (Auto) 0.0 


 


CBC Comment DIFF FINAL 


 


Differential Comment  








Physical Exam


HEENT: Pupils round and reactive to light; normocephalic; atraumatic; no 

jaundice.  Throat is clear.


NECK: Neck is supple, no JVD, no lymphadenopathy.


CHEST:  Chest is clear to auscultation and percussion.


CARDIAC:  Regular rate and rhythm with no murmur gallop or rubs.


ABDOMEN:  Soft, nondistended, nontender; no hepatosplenomegaly; bowel sounds 

are present in all four quadrants.'s midline surgical scar from previous 

gastric bypass


EXTREMITIES: No clubbing, cyanosis, or edema.


SKIN:  Normal; no rash; no jaundice.


CNS: alert and oriented times three.





Assessment and Plan


Plan


Patient has anemia, possible melena, had an upper endoscopy and colonoscopy 

today


Hemoglobin is stable


No sign of active bleeding





IMPRESSION:


Gastric ulcer at the anastomosis from previous gastric bypass \


Diverticular disease 


No active bleeding 





PLAN:


May feed patient


Protonix 40 mg daily


Upper endoscopy in two-month


Monitor H&H, if stable patient may be discharged from GI standpoint











Micahel Venegas MD Jan 8, 2018 07:42

## 2018-01-08 NOTE — PD.PROCEDR
GI Procedure





PROCEDURE PERFORMED


Upper endoscopy with biopsy


Colonoscopy





INDICATION FOR PROCEDURE


Anemia


Melena





PROCEDURE:


The procedure, risks and benefits were discussed with Ms. Diego and informed


consent was obtained.  Anesthesia sedated her with Diprivan.  She was placed in 

the left lateral decubitus position.





EGD:


The Pentax videoscope was introduced through the oropharynx and advanced to the 

second portion of the duodenum under direct visualization. Retroflexion was 

performed in the stomach.  Biopsy from an ulcer at the surgical margin was done 

and the scope brought back without any immediate complication





FINDINGS:


Gastric ulcer at the surgical anastomosis from previous gastric bypass


Otherwise normal





Colonoscopy:


The Pentax videoscope was introduced through the rectum and advanced to cecum. 

Retroflexion was performed in the rectum. Colonic prep was fair with stool 

throughout the colon may interfere with the vision of small lesions





FINDINGS:


Some stool throughout the colon


Diverticular disease








ESTIMATED BLOOD LOSS:


none





SPECIMENS REMOVED:


gastric ulcer





COMPLICATIONS:


none





IMPRESSION:


Gastric ulcer at the anastomosis from previous gastric bypass \


Diverticular disease 


No active bleeding 





PLAN:


May feed patient


Protonix 40 mg daily


Upper endoscopy in two-month


Monitor H&H, if stable patient may be discharged from GI standpoint











Michael Venegas MD Jan 8, 2018 07:40

## 2018-01-08 NOTE — HHI.DCPOC
Discharge Care Plan


Diagnosis:  


(1) Anemia


(2) Ulcer


Goals to Promote Your Health


* To prevent worsening of your condition and complications


* To maintain your health at the optimal level


Directions to Meet Your Goals


*** Take your medications as prescribed


*** Follow your dietary instruction


*** Follow activity as directed








*** Keep your appointments as scheduled


*** Take your immunizations and boosters as scheduled


*** If your symptoms worsen call your PCP, if no PCP go to Urgent Care Center 

or Emergency Room***


*** Smoking is Dangerous to Your Health. Avoid second hand smoke***


***Call the 24-hour hour crisis hotline for domestic abuse at 1-602.955.1912***











Mohan Martinez Jan 8, 2018 09:37

## 2018-01-08 NOTE — HHI.PR
Subjective


Remarks


Case discussed gastroenterology.  Patient tolerated procedures well.  No 

deteriorations overnight per nursing.  Patient herself ate breakfast with no 

issues.





Objective





Vital Signs








  Date Time  Temp Pulse Resp B/P (MAP) Pulse Ox O2 Delivery O2 Flow Rate FiO2


 


1/8/18 08:00  66 16 97/44 (61) 100   


 


1/8/18 08:00 96.7 65 18 98/54 (69) 98   


 


1/8/18 07:45 98.0 72 16 98/52 (67) 100   


 


1/8/18 04:00 97.6 66 20 97/64 (75) 98   


 


1/8/18 00:00 97.9 77 20 99/57 (71) 98   


 


1/7/18 20:00 98.7 74 20 100/47 (64) 99   


 


1/7/18 20:00  84      


 


1/7/18 16:00 97.9 76 22 110/60 (77) 96   


 


1/7/18 12:00 98.2 88 20 104/56 (72) 97   














I/O      


 


 1/7/18 1/7/18 1/7/18 1/8/18 1/8/18 1/8/18





 06:59 14:59 22:59 06:59 14:59 22:59


 


Intake Total 1151.5 ml 0 ml  1386 ml 1200 ml 


 


Balance 1151.5 ml 0 ml  1386 ml 1200 ml 


 


      


 


Intake Oral 0 ml 0 ml   600 ml 


 


IV Total 1151.5 ml   1386 ml  


 


Other     600 ml 


 


# Voids 3 8   3 


 


# Bowel Movements 0 2   7 








Result Diagram:  


1/8/18 0435                                                                    

            1/7/18 0620





Objective Remarks


Abdomen is soft, nontender, nondistended





A/P


Problem List:  


(1) Anemia


ICD Code:  D64.9 - Anemia, unspecified


(2) Gastric ulcer


ICD Code:  K25.9 - Gastric ulcer, unspecified as acute or chronic, without 

hemorrhage or perforation


Assessment and Plan


Melena


- Colonoscopy showing diverticular disease with no active bleeding.  EGD 

showing gastric ulcer with no active bleeding.  Biopsy obtained. 





Anemia pending stable in the globin, patient is stable for discharge today from 

GI standpoint.  Patient was counseled on remaining from nonsteroidal anti-

inflammatory drugs including aspirin.    Patient patient was informed that she 

might still have some dark or black stools for the next couple of days as this 

would be reflective of dried residual blood that his left elsewhere in the GI 

tract.  She was also counseled on the signs and symptoms of worsening GI bleed 

including further fatigue and paleness.  Biopsy to be followed up outpatient.  

Patient has met maximal benefit from hospitalization and is clinically stable 

for discharge w/ PPI.





Problem Qualifiers





(1) Anemia:  








Kobe Bustillo MD Jan 8, 2018 10:48